# Patient Record
Sex: MALE | URBAN - METROPOLITAN AREA
[De-identification: names, ages, dates, MRNs, and addresses within clinical notes are randomized per-mention and may not be internally consistent; named-entity substitution may affect disease eponyms.]

---

## 2019-10-25 NOTE — PATIENT DISCUSSION
PLAN: CE/IOL OD THEN OS, GOAL DEVAN, PT INTERESTED IN CUSTOM OU.  Will think about it and call back.

## 2019-10-25 NOTE — PATIENT DISCUSSION
Pt advised will need glasses for all ranges w/ Basic and for near and intermediate w/ Custom.  Pt understands.

## 2019-10-30 NOTE — PATIENT DISCUSSION
***11/1/19. .. 1160 [facility] reviewed HVF.  Baseline OU.  Do not recommend istents at this time.  Pt will remain glaucoma suspect.  Pt notified of results  [facility] [address].

## 2021-02-01 ENCOUNTER — IMPORTED ENCOUNTER (OUTPATIENT)
Dept: URBAN - METROPOLITAN AREA CLINIC 50 | Facility: CLINIC | Age: 26
End: 2021-02-01

## 2021-04-17 ASSESSMENT — VISUAL ACUITY
OS_SC: 20/70
OD_CC: J1+
OS_CC: J1+
OD_SC: 20/20-

## 2021-04-17 ASSESSMENT — TONOMETRY
OD_IOP_MMHG: 16
OS_IOP_MMHG: 16

## 2022-03-10 NOTE — PATIENT DISCUSSION
***11/1/19. .. 1160 [facility] reviewed HVF.  Baseline OU.  Do not recommend istents at this time.  Pt will remain glaucoma suspect.  Pt notified of results  [facility] [address].

## 2022-03-10 NOTE — PATIENT DISCUSSION
DISC WOULD RESOLVE MAX AS WELL. PT BOTHERED BY GLARE AND VISION SEEMS BLURRED. USE AT D OU PRIOR TO CAT EVAL.

## 2022-03-10 NOTE — PATIENT DISCUSSION
***11/1/19. .. [facility] reviewed HVF.  Baseline OU.  Do not recommend istents at this time.  Pt will remain glaucoma suspect.  Pt notified of results  [address].

## 2022-03-24 NOTE — PATIENT DISCUSSION
" She fell from the bed on hard floor and she's bleeding on her nose " awake all the time Despite some risk factors, the patient does not demonstrate definitive evidence of glaucoma at this time.

## 2022-03-24 NOTE — PATIENT DISCUSSION
***11/1/19. .. 1160 [facility] reviewed HVF.  Baseline OU.  Do not recommend istents at this time.  Pt will remain glaucoma suspect.  Pt notified of results  [facility] [address].

## 2022-04-12 NOTE — PATIENT DISCUSSION
***11/1/19. .. 1160 [facility] reviewed HVF.  Baseline OU.  Do not recommend istents at this time.  Pt will remain glaucoma suspect.  Pt notified of results  [address] [facility] [address].

## 2022-04-12 NOTE — PATIENT DISCUSSION
***11/1/19. .. 1160 [facility] reviewed HVF.  Baseline OU.  Do not recommend istents at this time.  Pt will remain glaucoma suspect.  Pt notified of results  [facility] [address].

## 2022-04-13 NOTE — PATIENT DISCUSSION
***11/1/19. .. 1160 [facility] reviewed HVF.  Baseline OU.  Do not recommend istents at this time.  Pt will remain glaucoma suspect.  Pt notified of results  [facility] [address] [facility] [address].

## 2022-04-21 NOTE — PATIENT DISCUSSION
***11/1/19. .. 1160 [facility] reviewed HVF.  Baseline OU.  Do not recommend istents at this time.  Pt will remain glaucoma suspect.  Pt notified of results  [facility] [address].

## 2022-04-26 NOTE — PATIENT DISCUSSION
***11/1/19. .. 1160 [facility] reviewed HVF.  Baseline OU.  Do not recommend istents at this time.  Pt will remain glaucoma suspect.  Pt notified of results  [facility] [address] [facility] [address].

## 2022-04-26 NOTE — PATIENT DISCUSSION
***11/1/19. .. 1160 [facility] reviewed HVF.  Baseline OU.  Do not recommend istents at this time.  Pt will remain glaucoma suspect.  Pt notified of results  [facility] [address].

## 2022-04-27 NOTE — PROCEDURE NOTE: CLINICAL
PROCEDURE NOTE: Punctal Plugs, Aaron Montague ([id number], [id number]) #1 Left Lower Lid. Prior to treatment, the risks/benefits/alternatives were discussed. The patient wished to proceed with procedure. Temporary collagen plugs were inserted. Patient tolerated procedure well. There were no complications. Post procedure instructions given. Concepcion Arnold PROCEDURE NOTE: Punctal Plugs, Dante Dissolvable ([id number], [id number]) #1 Right Lower Lid. Prior to treatment, the risks/benefits/alternatives were discussed. The patient wished to proceed with procedure. Temporary collagen plugs were inserted. Patient tolerated procedure well. There were no complications. Post procedure instructions given. Concepcion Arnold

## 2022-04-27 NOTE — PATIENT DISCUSSION
BLL PLUGS TODAY, DISC WILL FLARE WITH CAT SX, CONT USING SYSTANE PRN OU AND MAY NEED RESTASIS IF WORSENING.

## 2022-04-27 NOTE — PATIENT DISCUSSION
***11/1/19. .. 1160 [facility] reviewed HVF.  Baseline OU.  Do not recommend istents at this time.  Pt will remain glaucoma suspect.  Pt notified of results  [facility] [address].

## 2022-05-09 NOTE — PATIENT DISCUSSION
***11/1/19. .. 1160 [facility] reviewed HVF.  Baseline OU.  Do not recommend istents at this time.  Pt will remain glaucoma suspect.  Pt notified of results  [facility] [address].

## 2022-05-09 NOTE — PATIENT DISCUSSION
pt is showing return of CYL OD consistently with blurred vision. Disc that would give the [place] in [place] to heal and settle and treat JONNY aggressively. Will recheck in the fall and poss LVC OD if nec. NO CME today.

## 2022-09-08 NOTE — PATIENT DISCUSSION
HOLD FOR NOW ON GLASSES. Have You Had Microneedling Treatments Before?: has had a previous microneedling treatment When Was Your Last Treatment?: 8-22-22

## 2022-12-02 NOTE — PATIENT DISCUSSION
***4/8/22. Jonathan Greening Jonathan Greening per [address], pt elects AV SYNERGY OU.  Marilee.

## 2022-12-02 NOTE — PATIENT DISCUSSION
After YAG will need  2 stable MR then [facility] consult with Dr Yarelis Dorman.

## 2022-12-19 NOTE — PATIENT DISCUSSION
After YAG will need  2 stable MR then [facility] consult with Dr Marques Nichole.